# Patient Record
Sex: FEMALE | Race: WHITE | NOT HISPANIC OR LATINO | Employment: PART TIME | URBAN - METROPOLITAN AREA
[De-identification: names, ages, dates, MRNs, and addresses within clinical notes are randomized per-mention and may not be internally consistent; named-entity substitution may affect disease eponyms.]

---

## 2017-11-24 ENCOUNTER — SURGERY - HEALTHEAST (OUTPATIENT)
Dept: OBGYN | Facility: CLINIC | Age: 32
End: 2017-11-24

## 2017-11-24 ENCOUNTER — ANESTHESIA - HEALTHEAST (OUTPATIENT)
Dept: OBGYN | Facility: CLINIC | Age: 32
End: 2017-11-24

## 2017-11-24 ASSESSMENT — MIFFLIN-ST. JEOR: SCORE: 1666.11

## 2017-11-28 ENCOUNTER — COMMUNICATION - HEALTHEAST (OUTPATIENT)
Dept: OBGYN | Facility: CLINIC | Age: 32
End: 2017-11-28

## 2019-06-11 ENCOUNTER — RECORDS - HEALTHEAST (OUTPATIENT)
Dept: ADMINISTRATIVE | Facility: OTHER | Age: 34
End: 2019-06-11

## 2019-07-02 ENCOUNTER — HOSPITAL ENCOUNTER (OUTPATIENT)
Dept: CARDIOLOGY | Facility: CLINIC | Age: 34
Discharge: HOME | End: 2019-07-02
Attending: FAMILY MEDICINE

## 2019-07-02 DIAGNOSIS — Q23.9 ABNORMALITY OF AORTIC VALVE: ICD-10-CM

## 2019-07-02 LAB
AORTIC ROOT: 2.8 CM
BSA FOR ECHO PROCEDURE: 2.11 M2
CV BLOOD PRESSURE: ABNORMAL MMHG
CV ECHO HEIGHT: 65 IN
CV ECHO WEIGHT: 215 LBS
DOP CALC LVOT AREA: 2.83 CM2
DOP CALC LVOT DIAMETER: 1.9 CM
DOP CALC LVOT PEAK VEL: 85 CM/S
DOP CALC LVOT STROKE VOLUME: 50.4 CM3
DOP CALCLVOT PEAK VEL VTI: 17.8 CM
EJECTION FRACTION: 60 % (ref 55–75)
FRACTIONAL SHORTENING: 36.3 % (ref 28–44)
INTERVENTRICULAR SEPTUM IN END DIASTOLE: 0.99 CM (ref 0.6–0.9)
IVS/PW RATIO: 1.4
LA AREA 1: 13.6 CM2
LA AREA 2: 12 CM2
LEFT ATRIUM LENGTH: 4.44 CM
LEFT ATRIUM SIZE: 3.4 CM
LEFT ATRIUM TO AORTIC ROOT RATIO: 1.21 NO UNITS
LEFT ATRIUM VOLUME INDEX: 14.8 ML/M2
LEFT ATRIUM VOLUME: 31.2 ML
LEFT VENTRICLE CARDIAC INDEX: 1.6 L/MIN/M2
LEFT VENTRICLE CARDIAC OUTPUT: 3.3 L/MIN
LEFT VENTRICLE DIASTOLIC VOLUME INDEX: 27 CM3/M2 (ref 29–61)
LEFT VENTRICLE DIASTOLIC VOLUME: 57 CM3 (ref 46–106)
LEFT VENTRICLE HEART RATE: 66 BPM
LEFT VENTRICLE MASS INDEX: 55.5 G/M2
LEFT VENTRICLE SYSTOLIC VOLUME INDEX: 10.9 CM3/M2 (ref 8–24)
LEFT VENTRICLE SYSTOLIC VOLUME: 23 CM3 (ref 14–42)
LEFT VENTRICULAR INTERNAL DIMENSION IN DIASTOLE: 4.35 CM (ref 3.8–5.2)
LEFT VENTRICULAR INTERNAL DIMENSION IN SYSTOLE: 2.77 CM (ref 2.2–3.5)
LEFT VENTRICULAR MASS: 117.1 G
LEFT VENTRICULAR OUTFLOW TRACT MEAN GRADIENT: 2 MMHG
LEFT VENTRICULAR OUTFLOW TRACT MEAN VELOCITY: 61.1 CM/S
LEFT VENTRICULAR OUTFLOW TRACT PEAK GRADIENT: 3 MMHG
LEFT VENTRICULAR POSTERIOR WALL IN END DIASTOLE: 0.72 CM (ref 0.6–0.9)
LV STROKE VOLUME INDEX: 23.9 ML/M2
MITRAL VALVE DECELERATION SLOPE: 5650 MM/S2
MITRAL VALVE E/A RATIO: 1.2
MITRAL VALVE PRESSURE HALF-TIME: 50 MS
MV AVERAGE E/E' RATIO: 4.9 CM/S
MV DECELERATION TIME: 254 MS
MV E'TISSUE VEL-LAT: 17.4 CM/S
MV E'TISSUE VEL-MED: 9.55 CM/S
MV LATERAL E/E' RATIO: 3.8
MV MEDIAL E/E' RATIO: 6.9
MV PEAK A VELOCITY: 55.3 CM/S
MV PEAK E VELOCITY: 65.6 CM/S
MV VALVE AREA PRESSURE 1/2 METHOD: 4.4 CM2
NUC REST DIASTOLIC VOLUME INDEX: 3440 LBS
NUC REST SYSTOLIC VOLUME INDEX: 65 IN
TRICUSPID VALVE ANULAR PLANE SYSTOLIC EXCURSION: 1.8 CM

## 2019-07-02 ASSESSMENT — MIFFLIN-ST. JEOR: SCORE: 1666.11

## 2020-09-26 ENCOUNTER — VIRTUAL VISIT (OUTPATIENT)
Dept: FAMILY MEDICINE | Facility: OTHER | Age: 35
End: 2020-09-26
Payer: COMMERCIAL

## 2020-09-26 ENCOUNTER — AMBULATORY - HEALTHEAST (OUTPATIENT)
Dept: FAMILY MEDICINE | Facility: CLINIC | Age: 35
End: 2020-09-26

## 2020-09-26 DIAGNOSIS — Z20.822 SUSPECTED COVID-19 VIRUS INFECTION: ICD-10-CM

## 2020-09-26 PROCEDURE — 99421 OL DIG E/M SVC 5-10 MIN: CPT | Performed by: PHYSICIAN ASSISTANT

## 2020-09-27 NOTE — PROGRESS NOTES
"Date: 2020 11:59:44  Clinician: Ramu Hart  Clinician NPI: [id number]  Patient: Kaylene Schultz  Patient : 1985  Patient Address: [address], [place] [address]  Patient Phone: [phone]  Visit Protocol: URI  Patient Summary:  Kaylene is a 34 year old ( : 1985 ) female who initiated a [facility] Visit for COVID-19 (Coronavirus) evaluation and screening. When asked the question \"Please sign me up to receive news, health information and promotions from [facility].\", Kaylene responded \"No\".    Kaylene states her symptoms started 1-2 days ago.   Her symptoms consist of a headache, enlarged lymph nodes, facial pain or pressure, myalgia, malaise, and a sore throat. Kaylene also feels feverish.   Symptom details     Sore throat: Kaylene reports having moderate throat pain (4-6 on a 10 point pain scale), does not have exudate on her tonsils, and can swallow liquids. The lymph nodes in her neck are enlarged. A rash has not appeared on the skin since the sore throat started.     Temperature: Her current temperature is 99.7 degrees Fahrenheit.     Facial pain or pressure: The facial pain or pressure feels worse when bending over or leaning forward.     Headache: She states the headache is mild (1-3 on a 10 point pain scale).      Kaylene denies having cough, nasal congestion, ear pain, anosmia, vomiting, rhinitis, nausea, wheezing, chills, teeth pain, ageusia, and diarrhea. She also denies having a sinus infection within the past year, taking antibiotic medication in the past month, and having recent facial or sinus surgery in the past 60 days. She is not experiencing dyspnea.   Precipitating events  Within the past week, Kaylene has not been exposed to someone with strep throat. She has not recently been exposed to someone with influenza. Kaylene has been in close contact with the following high risk individuals: adults 65 or older, people with asthma, heart disease or diabetes, and children " under the age of 5.   Pertinent COVID-19 (Coronavirus) information  In the past 14 days, Kaylene has not worked in a congregate living setting.   She either works or volunteers as a healthcare worker or a , or works or volunteers in a healthcare facility. 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 She lives with a healthcare worker.   Kaylene has not had a close contact with a laboratory-confirmed COVID-19 patient within 14 days of symptom onset.   Since December 2019, Kaylene and has had upper respiratory infection (URI) or influenza-like illness. Has not been diagnosed with lab-confirmed COVID-19 test      Date(s) of previous URI or influenza-like illness (free-text): March 2020     Symptoms Kaylene experienced during previous URI or influenza-like illness as reported by the patient (free-text): Fever, chills, runny nose, sore throat        Pertinent medical history  Kaylene does not get yeast infections when she takes antibiotics.   Kaylene does not need a return to work/school note.   Weight: 176 lbs   Kaylene does not smoke or use smokeless tobacco.   She denies pregnancy and denies breastfeeding. She has menstruated in the past month.   Weight: 176 lbs    MEDICATIONS: No current medications, ALLERGIES: Ceclor, erythromycin base  Clinician Response:  Dear Kaylene,   Your symptoms show that you may have coronavirus (COVID-19). This illness can cause fever, cough and trouble breathing. Many people get a mild case and get better on their own. Some people can get very sick.  What should I do?  We would like to test you for this virus.   1. Please call [phone] to schedule your visit. Explain that you were referred by [facility] to have a COVID-19 test. Be ready to share your [facility] visit ID  "number.  The following will serve as your written order for this COVID Test, ordered by me, for the indication of suspected COVID [Z20.828]: The test will be ordered in [employer], our electronic health record, after you are scheduled. It will show as ordered and authorized by Carlito Choi MD.  Order: COVID-19 (Coronavirus) PCR [facility]TOMATIC [facility]      2. When it's time for your COVID test:  Stay at least 6 feet away from others. (If someone will drive you to your test, stay in the backseat, as far away from the  as you can.)   Cover your mouth and nose with a mask, tissue or washcloth.  Go straight to the testing site. Don't make any stops on the way there or back.      3.Starting now: Stay home and away from others (self-isolate) until:   You've had no fever---and no medicine that reduces fever---for one full day (24 hours). And...   Your other symptoms have gotten better. For example, your cough or breathing has improved. And...   At least 10 days have passed since your symptoms started.       During this time, don't leave the house except for testing or medical care.   Stay in your own room, even for meals. Use your own bathroom if you can.   Stay away from others in your home. No hugging, kissing or shaking hands. No visitors.  Don't go to work, school or anywhere else.    Clean \"high touch\" surfaces often (doorknobs, counters, handles, etc.). Use a household cleaning spray or wipes. You'll find a full list of  on the EPA website: www.epa.gov/pesticide-registration/list-n-disinfectants-use-against-sars-cov-2.   Cover your mouth and nose with a mask, tissue or washcloth to avoid spreading germs.  Wash your hands and face often. Use soap and water.  Caregivers in these groups are at risk for severe illness due to COVID-19:  o People 65 years and older  o People who live in a nursing home or long-term care facility  o People with chronic disease (lung, heart, cancer, diabetes, kidney, " liver, immunologic)  o People who have a weakened immune system, including those who:   Are in cancer treatment  Take medicine that weakens the immune system, such as corticosteroids  Had a bone marrow or organ transplant  Have an immune deficiency  Have poorly controlled HIV or AIDS  Are obese (body mass index of 40 or higher)  Smoke regularly   o Caregivers should wear gloves while washing dishes, handling laundry and cleaning bedrooms and bathrooms.  o Use caution when washing and drying laundry: Don't shake dirty laundry, and use the warmest water setting that you can.  o For more tips, go to www.cdc.gov/coronavirus/2019-ncov/downloads/10Things.pdf.    4.Sign up for [employer]. We know it's scary to hear that you might have COVID-19. We want to track your symptoms to make sure you're okay over the next 2 weeks. Please look for an email from [employer]---this is a free, online program that we'll use to keep in touch. To sign up, follow the link in the email. Learn more at [email or web address]  How can I take care of myself?   Get lots of rest. Drink extra fluids (unless a doctor has told you not to).   Take Tylenol (acetaminophen) for fever or pain. If you have liver or kidney problems, ask your family doctor if it's okay to take Tylenol.   Adults can take either:    650 mg (two 325 mg pills) every 4 to 6 hours, or...   1,000 mg (two 500 mg pills) every 8 hours as needed.    Note: Don't take more than 3,000 mg in one day. Acetaminophen is found in many medicines (both prescribed and over-the-counter medicines). Read all labels to be sure you don't take too much.   For children, check the Tylenol bottle for the right dose. The dose is based on the child's age or weight.    If you have other health problems (like cancer, heart failure, an organ transplant or severe kidney disease): Call your specialty clinic if you don't feel better in the next 2 days.       Know when to call 911. Emergency warning  signs include:    Trouble breathing or shortness of breath Pain or pressure in the chest that doesn't go away Feeling confused like you haven't felt before, or not being able to wake up Bluish-colored lips or face.  Where can I get more information?   [facility] -- About COVID-19: [email or web address]   CDC -- What to Do If You're Sick: www.cdc.gov/coronavirus/2019-ncov/about/steps-when-sick.html   CDC -- Ending Home Isolation: www.cdc.gov/coronavirus/2019-ncov/hcp/disposition-in-home-patients.html   [facility] -- Caring for Someone: www.cdc.gov/coronavirus/2019-ncov/if-you-are-sick/care-for-someone.html   [facility] -- Interim Guidance for Hospital Discharge to Home: www.health.[facility].[place]./diseases/coronavirus/hcp/hospdischarge.pdf   [facility] clinical trials (COVID-19 research studies): clinicalaffairs.[facility]-clinical-trials    Below are the COVID-19 hotlines at the [place] Department of Health ([facility]). Interpreters are available.    For health questions: Call 843-659-6834 or 1-507.575.6701 (7 a.m. to 7 p.m.) For questions about schools and childcare: Call 450-637-6610 or 1-112.130.6930 (7 a.m. to 7 p.m.)    Diagnosis: Acute upper respiratory infection, unspecified  Diagnosis ICD: J06.9  Additional Clinician Notes:   If your symptoms are not improving, or your symptoms are worsening, please come to one of our urgent care locations for further evaluation.

## 2020-11-15 ENCOUNTER — E-VISIT (OUTPATIENT)
Dept: URGENT CARE | Facility: CLINIC | Age: 35
End: 2020-11-15
Payer: COMMERCIAL

## 2020-11-15 DIAGNOSIS — Z71.89 ADVICE GIVEN ABOUT 2019-NCOV INFECTION: Primary | ICD-10-CM

## 2020-11-15 PROCEDURE — 99421 OL DIG E/M SVC 5-10 MIN: CPT | Performed by: FAMILY MEDICINE

## 2020-11-15 NOTE — PATIENT INSTRUCTIONS
Dear Kaylene Schultz,    Please do a separate e-visit for your son so we can have him tested for covid.     Based on the details you've shared, you are concerned about contact with someone who may have been exposed to coronavirus (COVID-19). Based on [facility] guidelines you do not need to be tested at this time.  Testing for people who do not have symptoms is only required in certain instances, including direct contact with a person who has tested positive for COVID-19, or for those who are traveling to locations where testing is required beforehand.  Please submit a new visit or call your clinic if you think we missed needed information, your exposure information has changed, or you develop symptoms.  What are the symptoms of COVID-19?  The most common symptoms are cough, fever and trouble breathing. Less common symptoms include headache, body aches, fatigue (feeling very tired), chills, sore throat, stuffy or runny nose, diarrhea (loose poop), loss of taste or smell, belly pain, and nausea or vomiting (feeling sick to your stomach or throwing up).  Where can I get more information?  [facility] - About COVID-19: [email or web address]  CDC - What to Do If You're Sick: www.cdc.gov/coronavirus/2019-ncov/about/steps-when-sick.html  CDC - Ending Home Isolation: www.cdc.gov/coronavirus/2019-ncov/hcp/disposition-in-home-patients.html  CDC - Caring for Someone: www.cdc.gov/coronavirus/2019-ncov/if-you-are-sick/care-for-someone.html  [facility] - Interim Guidance for Hospital Discharge to Home: www.health.[facility].[place]/diseases/coronavirus/hcp/hospdischarge.pdf  [facility] clinical trials (COVID-19 research studies): clinicalaffairs.[facility]/umn-clinical-trials  Below are the COVID-19 hotlines at the [place] Department of Health ([facility]). Interpreters are available.  For health questions: Call 505-491-8055 or 1-747.580.8367 (7 a.m. to 7 p.m.)  For questions about schools and childcare: Call  679.324.8049 or 1-376.278.5094 (7 a.m. to 7 p.m.)  November 15, 2020    RE:  Kaylene Antoine                                                                                                                                                       [address] [place] [address]        To whom it may concern:    I evaluated Kaylene Schultz on 11/15/20. Kaylene Schultz should be excused from work/school for any missed work related to patient's son's covid exposure.       Sincerely,  Mary Cotton MD

## 2021-01-04 ENCOUNTER — HEALTH MAINTENANCE LETTER (OUTPATIENT)
Age: 36
End: 2021-01-04

## 2021-05-31 VITALS — BODY MASS INDEX: 35.82 KG/M2 | WEIGHT: 215 LBS | HEIGHT: 65 IN

## 2021-06-03 VITALS — HEIGHT: 65 IN | BODY MASS INDEX: 35.82 KG/M2 | WEIGHT: 215 LBS

## 2021-06-14 NOTE — ANESTHESIA POSTPROCEDURE EVALUATION
Patient: Kaylene Schultz  REPEAT  SECTION  Anesthesia type: spinal    Patient location: Labor and Delivery  Last vitals:   Vitals:    17 1449   BP: 123/69   Pulse: (!) 52   Resp: 16   Temp: 37.2  C (99  F)   SpO2:      Post vital signs: stable  Level of consciousness: awake, alert and responds to simple questions  Post-anesthesia pain: pain controlled  Post-anesthesia nausea and vomiting: no  Pulmonary: unassisted, return to baseline  Cardiovascular: stable and blood pressure at baseline  Hydration: adequate  Anesthetic events: no    QCDR Measures:  ASA# 11 - Amanda-op Cardiac Arrest: ASA11B - Patient did NOT experience unanticipated cardiac arrest  ASA# 12 - Amanda-op Mortality Rate: ASA12B - Patient did NOT die  ASA# 13 - PACU Re-Intubation Rate: ASA13B - Patient did NOT require a new airway mgmt  ASA# 10 - Composite Anes Safety: ASA10A - No serious adverse event    Additional Notes:

## 2021-06-14 NOTE — ANESTHESIA PROCEDURE NOTES
Spinal Block    Patient location during procedure: OB  Start time: 11/24/2017 10:02 AM  End time: 11/24/2017 9:56 AM  Reason for block: primary anesthetic    Staffing:  Performing  Anesthesiologist: ALBERTO MATHUR    Preanesthetic Checklist  Completed: patient identified, risks, benefits, and alternatives discussed, timeout performed, consent obtained, at patient's request, airway assessed, oxygen available, suction available, emergency drugs available and hand hygiene performed  Spinal Block  Patient position: sitting  Prep: ChloraPrep  Patient monitoring: heart rate, cardiac monitor, continuous pulse ox and blood pressure  Approach: right paramedian  Location: L3-4  Injection technique: single-shot  Needle type: pencil-tip   Needle gauge: 24 G      Additional Notes:  Clear csf

## 2021-06-14 NOTE — ANESTHESIA PREPROCEDURE EVALUATION
Anesthesia Evaluation      Patient summary reviewed   No history of anesthetic complications     Airway   Mallampati: I  Neck ROM: full   Pulmonary - normal exam    breath sounds clear to auscultation  (+) asthma                           Cardiovascular - negative ROS and normal exam  Rhythm: regular  Rate: normal,         Neuro/Psych - negative ROS     Endo/Other    (+) pregnant     GI/Hepatic/Renal - negative ROS           Dental                         Anesthesia Plan  Planned anesthetic: spinal    ASA 2     Anesthetic plan and risks discussed with: patient    Post-op plan: routine recovery

## 2021-06-16 PROBLEM — O34.219 PREVIOUS CESAREAN DELIVERY AFFECTING PREGNANCY: Status: ACTIVE | Noted: 2017-11-24

## 2021-10-11 ENCOUNTER — HEALTH MAINTENANCE LETTER (OUTPATIENT)
Age: 36
End: 2021-10-11

## 2021-11-22 ENCOUNTER — IMMUNIZATION (OUTPATIENT)
Dept: NURSING | Facility: CLINIC | Age: 36
End: 2021-11-22
Payer: COMMERCIAL

## 2021-11-22 PROCEDURE — 0004A PR COVID VAC PFIZER DIL RECON 30 MCG/0.3 ML IM: CPT

## 2021-11-22 PROCEDURE — 91300 PR COVID VAC PFIZER DIL RECON 30 MCG/0.3 ML IM: CPT

## 2022-01-30 ENCOUNTER — HEALTH MAINTENANCE LETTER (OUTPATIENT)
Age: 37
End: 2022-01-30

## 2022-09-24 ENCOUNTER — HEALTH MAINTENANCE LETTER (OUTPATIENT)
Age: 37
End: 2022-09-24

## 2023-05-08 ENCOUNTER — HEALTH MAINTENANCE LETTER (OUTPATIENT)
Age: 38
End: 2023-05-08

## 2024-07-14 ENCOUNTER — HEALTH MAINTENANCE LETTER (OUTPATIENT)
Age: 39
End: 2024-07-14

## 2024-12-01 ENCOUNTER — HEALTH MAINTENANCE LETTER (OUTPATIENT)
Age: 39
End: 2024-12-01